# Patient Record
Sex: MALE | Race: WHITE
[De-identification: names, ages, dates, MRNs, and addresses within clinical notes are randomized per-mention and may not be internally consistent; named-entity substitution may affect disease eponyms.]

---

## 2017-12-18 ENCOUNTER — HOSPITAL ENCOUNTER (OUTPATIENT)
Dept: HOSPITAL 35 - PAIN | Age: 72
Discharge: HOME | End: 2017-12-18
Attending: ANESTHESIOLOGY
Payer: COMMERCIAL

## 2017-12-18 VITALS — HEIGHT: 75.98 IN | BODY MASS INDEX: 27.92 KG/M2 | WEIGHT: 229.25 LBS

## 2017-12-18 VITALS — DIASTOLIC BLOOD PRESSURE: 95 MMHG | SYSTOLIC BLOOD PRESSURE: 171 MMHG

## 2017-12-18 DIAGNOSIS — M96.1: ICD-10-CM

## 2017-12-18 DIAGNOSIS — M47.26: Primary | ICD-10-CM

## 2019-03-25 ENCOUNTER — HOSPITAL ENCOUNTER (OUTPATIENT)
Dept: HOSPITAL 35 - PAIN | Age: 74
Discharge: HOME | End: 2019-03-25
Attending: ANESTHESIOLOGY
Payer: COMMERCIAL

## 2019-03-25 VITALS — DIASTOLIC BLOOD PRESSURE: 95 MMHG | SYSTOLIC BLOOD PRESSURE: 141 MMHG

## 2019-03-25 VITALS — HEIGHT: 75.98 IN | WEIGHT: 209 LBS | BODY MASS INDEX: 25.45 KG/M2

## 2019-03-25 DIAGNOSIS — Z98.890: ICD-10-CM

## 2019-03-25 DIAGNOSIS — M96.1: ICD-10-CM

## 2019-03-25 DIAGNOSIS — I10: ICD-10-CM

## 2019-03-25 DIAGNOSIS — M47.26: Primary | ICD-10-CM

## 2019-03-25 DIAGNOSIS — G89.29: ICD-10-CM

## 2019-03-25 DIAGNOSIS — Z79.899: ICD-10-CM

## 2019-03-25 NOTE — NUR
Pain Clinic Assessment:
 
1. History of Osteoarthritis:
spine
   History of Rheumatoid Arthritis:
Not Applicable
 
2. Height: 6 ft. 4 in. 193.0 cm.
   Weight: 209.0 lb.  oz. 94.802 kg.
   Patient's BMI: 25.5
 
3. Vital Signs:
   BP: 141/95 Pulse: 65 Resp: 20
   Temp:  02 Sat: 97 ECG Mon:
 
4. Pain Intensity: 3 NOW 10 IN AM
 
5. Fall Risk:
   Dizziness: Y  Needs help standing or walking: Y
   Fallen in the last 3 months: Y
   Fall risk comments:
 
 
6. Patient on Blood Thinner: None
 
7. History of Hypertension: Y
 
8. Opioid Therapy greater than 6 weeks: N
   Opiate Contract Signed:
 
9. Risk Assessment Tool Provided:
 
10. Functional Assessment Tool:
 
11. Recreational Drug Use: Never Drug Type:
    Tobacco Use: Never Smoker Tobacco Type:
       Amount or Packs/day:  How Many Years:
    Alcohol Use: Yes  Frequency:  Quant: